# Patient Record
Sex: FEMALE
[De-identification: names, ages, dates, MRNs, and addresses within clinical notes are randomized per-mention and may not be internally consistent; named-entity substitution may affect disease eponyms.]

---

## 2019-03-09 PROBLEM — Z00.00 ENCOUNTER FOR PREVENTIVE HEALTH EXAMINATION: Status: ACTIVE | Noted: 2019-03-09

## 2019-03-11 ENCOUNTER — APPOINTMENT (OUTPATIENT)
Dept: ORTHOPEDIC SURGERY | Facility: CLINIC | Age: 39
End: 2019-03-11
Payer: MEDICAID

## 2019-03-11 VITALS — BODY MASS INDEX: 37 KG/M2 | WEIGHT: 196 LBS | HEIGHT: 61 IN

## 2019-03-11 DIAGNOSIS — Z78.9 OTHER SPECIFIED HEALTH STATUS: ICD-10-CM

## 2019-03-11 DIAGNOSIS — M25.561 PAIN IN RIGHT KNEE: ICD-10-CM

## 2019-03-11 DIAGNOSIS — Z82.61 FAMILY HISTORY OF ARTHRITIS: ICD-10-CM

## 2019-03-11 DIAGNOSIS — Z80.9 FAMILY HISTORY OF MALIGNANT NEOPLASM, UNSPECIFIED: ICD-10-CM

## 2019-03-11 DIAGNOSIS — Z87.09 PERSONAL HISTORY OF OTHER DISEASES OF THE RESPIRATORY SYSTEM: ICD-10-CM

## 2019-03-11 DIAGNOSIS — M25.562 PAIN IN RIGHT KNEE: ICD-10-CM

## 2019-03-11 PROCEDURE — 99204 OFFICE O/P NEW MOD 45 MIN: CPT

## 2019-03-11 PROCEDURE — 73564 X-RAY EXAM KNEE 4 OR MORE: CPT | Mod: LT

## 2019-03-11 NOTE — HISTORY OF PRESENT ILLNESS
[de-identified] : This is very nice 38-year-old female experiencing bilateral knee pain, which is moderate in intensity in the left knee and mild intensity in the right knee. The pain started after she tripped approximately 3 weeks ago. She did not fall to the ground when this happened but her knee gave way. Since then she denies any catching or clicking or locking in either knee in the knees are not giving way. She has pain mainly on the medial aspect of the left knee. The pain mildly limits activities of daily living. Walking tolerance is mildly reduced. She is not taking medications for this. She does not use a cane or walker or knee brace. She has not tried physical therapy. The pain does not radiate down the leg to the feet and is not associated with numbness or tingling or weakness.

## 2019-03-11 NOTE — PHYSICAL EXAM
[de-identified] : Patient is well nourished, well-developed, in no acute distress, with appropriate mood and affect. The patient is oriented to time, place, and person. Respirations are even and unlabored. Gait evaluation does not reveal a limp. There is no inguinal adenopathy. The right limb is well-perfused and showed 2+ dp/pt pulses, without skin lesions, shows a grossly normal motor and sensory examination. Right knee motion is painless and the knee moves from 0-130 degrees. The knee is stable within that range-of-motion to AP and ML stress with a 1A Lachman, negative anterior or posterior drawer and no instability to varus or valgus stress. The alignment of the knee is 5° of valgus. No effusion or crepitus is noted. No tenderness to palpation about the medial or lateral joint line, medial or lateral tibial plateau, medial or lateral femoral condyle, medial or lateral patellar facets, superior or inferior pole of the patella. Tucker's is negative. Muscle strength is normal. Pedal pulses are palpable. Hip examination was negative. The left limb is well-perfused and showed 2+ dp/pt pulses, without skin lesions, shows a grossly normal motor and sensory examination. Left knee motion is painless and the knee moves from 0-130 degrees. The knee is stable within that range-of-motion to AP and ML stress with a 1A Lachman, negative anterior or posterior drawer and no instability to varus or valgus stress. The alignment of the knee is 5° of valgus. No effusion or crepitus is noted. Tender to palpation of the medial collateral ligament. No tenderness to palpation about the medial or lateral joint line, medial or lateral tibial plateau, lateral femoral condyle, medial or lateral patellar facets, superior or inferior pole of the patella. Tucker's is negative. Muscle strength is normal. Pedal pulses are palpable. Hip examination was negative.\par  [de-identified] : Long standing knee, AP knee, lateral knee, and patellar views of the bilateral knee were ordered and taken in the office and demonstrate no evidence of degenerative joint disease of the knee, fractures, intra-articular pathology.

## 2019-03-11 NOTE — DISCUSSION/SUMMARY
[de-identified] : This patient has bilateral knee pain. The left knee appears to be secondary to a sprain of her medial collateral ligament in the right knee appears to be secondary to overload hamstring from knee left knee. I prescribed a playmaker style brace for the left knee and prescribed a course of physical therapy Mobic. Recommended follow up in 6 weeks. If he is not improving in 6 weeks and we will consider an MRI of the left knee.

## 2019-03-25 ENCOUNTER — APPOINTMENT (OUTPATIENT)
Dept: ORTHOPEDIC SURGERY | Facility: CLINIC | Age: 39
End: 2019-03-25

## 2019-03-25 ENCOUNTER — TRANSCRIPTION ENCOUNTER (OUTPATIENT)
Age: 39
End: 2019-03-25

## 2019-04-22 ENCOUNTER — APPOINTMENT (OUTPATIENT)
Dept: ORTHOPEDIC SURGERY | Facility: CLINIC | Age: 39
End: 2019-04-22
Payer: MEDICAID

## 2019-04-22 VITALS
DIASTOLIC BLOOD PRESSURE: 79 MMHG | WEIGHT: 195 LBS | SYSTOLIC BLOOD PRESSURE: 112 MMHG | HEIGHT: 61 IN | HEART RATE: 78 BPM | BODY MASS INDEX: 36.82 KG/M2

## 2019-04-22 PROCEDURE — 99215 OFFICE O/P EST HI 40 MIN: CPT

## 2019-04-22 PROCEDURE — 72100 X-RAY EXAM L-S SPINE 2/3 VWS: CPT

## 2019-04-22 RX ORDER — MELOXICAM 15 MG/1
15 TABLET ORAL
Qty: 30 | Refills: 1 | Status: ACTIVE | COMMUNITY
Start: 2019-04-22 | End: 1900-01-01

## 2019-04-22 NOTE — CONSULT LETTER
[Dear  ___] : Dear  [unfilled], [Consult Letter:] : I had the pleasure of evaluating your patient, [unfilled]. [Please see my note below.] : Please see my note below. [Consult Closing:] : Thank you very much for allowing me to participate in the care of this patient.  If you have any questions, please do not hesitate to contact me. [Sincerely,] : Sincerely, [[email or web address]] : Damien Augustine MD

## 2019-04-22 NOTE — PHYSICAL EXAM
[Antalgic] : antalgic [Diamond's Sign] : negative Diamond's sign [Pronator Drift] : negative pronator drift [SLR] : negative straight leg raise [Motor Strength Lower Extremities] : left (5/5) [] : Sensory: [L5-RT] : L5 [0] : left ankle jerk 0 [de-identified] : Lumbar ROM: Full, on painful\par NTTP L spine and b/l paraspinals L region. \par Skin intact L spine. No rashes, ulcers, blisters. \par No lymphedema. \par Rapid alternating movements- Intact. \par Full and non-painful ROM RUE, LUE, RLE, and LLE. Skin intact RUE, LUE, RLE, and LLE. No rashes, blisters, ulcers. NTTP RUE, LUE, RLE, and LLE. No evidence of dislocation or subluxation B/L.\par  [de-identified] : 2 views AP and Lat of LS Spine from [id number] 03/25/19. Read and dictated by Dr. Damien Augustine Board Certified orthopaedic surgeon Congenital spondylolisthesis L5S1 grade 1 \par \par \par

## 2019-04-22 NOTE — DISCUSSION/SUMMARY
[de-identified] : 38 yo female with L5S1 grade 1 spondylolisthesis, recommend PT, NSAIDS, MRI of L Spine. Weight loss. RTO 6 weeks. \par \par Diagnosis, prognosis, natural history and treatment was discussed with patient. Patient was advised if the following symptoms develop: chills, fever, loss of bladder control, bladder incontinence or urinary retention, numbness/tingling or weakness is present in upper or lower extremities, to go to the nearest ER and to call the office immediately to inform us.\par \par

## 2019-04-22 NOTE — HISTORY OF PRESENT ILLNESS
[Bending] : worsened by bending [Walking] : worsened by walking [Prolonged Standing] : worsened by prolonged standing [Standing] : worsened by standing [Rest] : relieved by rest [de-identified] : Pt presents with c/o Low back pain for the past 18 months. Pain is stable, Pain radiates to posterior aspect of RLE to foot, Pt denies numbness,tingling on B/L LE. Pt denies recent injury/falls. Pt is not taking meds for pain at this time. Pt does not participate with PT at this time. pt denies having ANNE in the past. Pt denies Loss of bladder control, bladder incontinence or urinary retention.\par The patient's past medical history, past surgical history, medications, allergies, and social history were reviewed by me today with the patient and documented accordingly. In addition, the patient's family history, which is noncontributory to this visit, was also reviewed.\par  [Heat] : not relieved by heat [Ice] : not relieved by ice [Recumbency] : not relieved by recumbency [Ataxia] : no ataxia [Incontinence] : no incontinence [Loss of Dexterity] : good dexterity [Urinary Ret.] : no urinary retention

## 2019-04-29 ENCOUNTER — APPOINTMENT (OUTPATIENT)
Dept: ORTHOPEDIC SURGERY | Facility: CLINIC | Age: 39
End: 2019-04-29
Payer: MEDICAID

## 2019-04-29 DIAGNOSIS — S83.412A SPRAIN OF MEDIAL COLLATERAL LIGAMENT OF LEFT KNEE, INITIAL ENCOUNTER: ICD-10-CM

## 2019-04-29 PROCEDURE — 99214 OFFICE O/P EST MOD 30 MIN: CPT

## 2019-04-29 NOTE — PHYSICAL EXAM
[de-identified] : Patient is well nourished, well-developed, in no acute distress, with appropriate mood and affect. The patient is oriented to time, place, and person. Respirations are even and unlabored. Gait evaluation does not reveal a limp. There is no inguinal adenopathy. The right limb is well-perfused and showed 2+ dp/pt pulses, without skin lesions, shows a grossly normal motor and sensory examination. Right knee motion is painless and the knee moves from 0-130 degrees. The knee is stable within that range-of-motion to AP and ML stress with a 1A Lachman, negative anterior or posterior drawer and no instability to varus or valgus stress. The alignment of the knee is 5° of valgus. No effusion or crepitus is noted. No tenderness to palpation about the medial or lateral joint line, medial or lateral tibial plateau, medial or lateral femoral condyle, medial or lateral patellar facets, superior or inferior pole of the patella. Tucker's is negative. Muscle strength is normal. Pedal pulses are palpable. Hip examination was negative. The left limb is well-perfused and showed 2+ dp/pt pulses, without skin lesions, shows a grossly normal motor and sensory examination. Left knee motion is painless and the knee moves from 0-130 degrees. The knee is stable within that range-of-motion to AP and ML stress with a 1A Lachman, negative anterior or posterior drawer and no instability to varus or valgus stress. The alignment of the knee is 5° of valgus. No effusion or crepitus is noted. Tender to palpation of the medial joint line. No tenderness to palpation about the lateral joint line, medial or lateral tibial plateau, lateral femoral condyle, medial or lateral patellar facets, superior or inferior pole of the patella. Tucker's is positive medially. Muscle strength is normal. Pedal pulses are palpable. Hip examination was negative.\par  [de-identified] : Long standing knee, AP knee, lateral knee, and patellar views of the bilateral knee were reviewed from the last visit and demonstrate no evidence of degenerative joint disease of the knee, fractures, intra-articular pathology.

## 2019-04-29 NOTE — HISTORY OF PRESENT ILLNESS
[de-identified] : This is very nice 39-year-old female experiencing bilateral knee pain, which is moderate in intensity in the left knee and mild intensity in the right knee. The pain started after she tripped approximately 2 months ago. She did not fall to the ground when this happened but her knee gave way. Since then she denies any catching or clicking or locking in either knee in the knees are not giving way. She has pain mainly on the medial aspect of the left knee. The right knee is significantly better today.  The  left knee pain mildly limits activities of daily living. Walking tolerance is mildly reduced.  She is not taking medications for this. She does not use a cane or walker or knee brace. Physical therapy did not help and she has continued with HEP and this does not help. She has tried knee braces but they fall down on her. The pain does not radiate to the feet and it is not associated with numbness or tingling or weakness.

## 2019-04-29 NOTE — DISCUSSION/SUMMARY
[de-identified] : This patient is resolved right knee pain but continues to have medial-based left knee pain. She does have positive meniscal signs are going to obtain an MRI of the left knee to better evaluate for a meniscal tear. I will call her after reviewing the MRI imaging to review next ups which may include an intra-articular cortisone injection. She'll try fine a better fitting neoprene sleeve knee brace. She will continue with a home exercise program.

## 2019-05-01 ENCOUNTER — OTHER (OUTPATIENT)
Age: 39
End: 2019-05-01

## 2020-01-23 ENCOUNTER — APPOINTMENT (OUTPATIENT)
Dept: ORTHOPEDIC SURGERY | Facility: CLINIC | Age: 40
End: 2020-01-23
Payer: MEDICAID

## 2020-01-23 DIAGNOSIS — Q76.2 CONGENITAL SPONDYLOLISTHESIS: ICD-10-CM

## 2020-01-23 PROCEDURE — 99214 OFFICE O/P EST MOD 30 MIN: CPT

## 2020-01-23 PROCEDURE — 72100 X-RAY EXAM L-S SPINE 2/3 VWS: CPT

## 2020-01-23 RX ORDER — NAPROXEN 500 MG/1
500 TABLET ORAL
Qty: 60 | Refills: 1 | Status: ACTIVE | COMMUNITY
Start: 2020-01-23 | End: 1900-01-01

## 2020-04-23 ENCOUNTER — APPOINTMENT (OUTPATIENT)
Dept: ORTHOPEDIC SURGERY | Facility: CLINIC | Age: 40
End: 2020-04-23